# Patient Record
Sex: MALE | Race: BLACK OR AFRICAN AMERICAN | URBAN - METROPOLITAN AREA
[De-identification: names, ages, dates, MRNs, and addresses within clinical notes are randomized per-mention and may not be internally consistent; named-entity substitution may affect disease eponyms.]

---

## 2019-08-02 ENCOUNTER — OFFICE VISIT (OUTPATIENT)
Dept: URGENT CARE | Facility: URGENT CARE | Age: 19
End: 2019-08-02
Payer: COMMERCIAL

## 2019-08-02 ENCOUNTER — HOSPITAL ENCOUNTER (EMERGENCY)
Facility: CLINIC | Age: 19
Discharge: HOME | End: 2019-08-02
Attending: EMERGENCY MEDICINE | Admitting: EMERGENCY MEDICINE
Payer: COMMERCIAL

## 2019-08-02 VITALS
SYSTOLIC BLOOD PRESSURE: 111 MMHG | WEIGHT: 191 LBS | BODY MASS INDEX: 26.74 KG/M2 | OXYGEN SATURATION: 97 % | TEMPERATURE: 103.7 F | HEART RATE: 104 BPM | HEIGHT: 71 IN | DIASTOLIC BLOOD PRESSURE: 55 MMHG

## 2019-08-02 VITALS
TEMPERATURE: 101.5 F | WEIGHT: 192 LBS | OXYGEN SATURATION: 99 % | HEIGHT: 71 IN | SYSTOLIC BLOOD PRESSURE: 136 MMHG | RESPIRATION RATE: 18 BRPM | DIASTOLIC BLOOD PRESSURE: 53 MMHG | BODY MASS INDEX: 26.88 KG/M2

## 2019-08-02 DIAGNOSIS — J02.0 STREPTOCOCCAL PHARYNGITIS: ICD-10-CM

## 2019-08-02 DIAGNOSIS — R50.9 FEVER, UNSPECIFIED FEVER CAUSE: ICD-10-CM

## 2019-08-02 DIAGNOSIS — I95.9 HYPOTENSION, UNSPECIFIED HYPOTENSION TYPE: Primary | ICD-10-CM

## 2019-08-02 DIAGNOSIS — R00.0 TACHYCARDIA: ICD-10-CM

## 2019-08-02 LAB
BASOPHILS # BLD AUTO: 0 10E9/L (ref 0–0.2)
BASOPHILS NFR BLD AUTO: 0.1 %
DEPRECATED S PYO AG THROAT QL EIA: ABNORMAL
DIFFERENTIAL METHOD BLD: ABNORMAL
EOSINOPHIL # BLD AUTO: 0 10E9/L (ref 0–0.7)
EOSINOPHIL NFR BLD AUTO: 0 %
LYMPHOCYTES # BLD AUTO: 0.9 10E9/L (ref 0.8–5.3)
LYMPHOCYTES NFR BLD AUTO: 5.4 %
MONOCYTES # BLD AUTO: 0.9 10E9/L (ref 0–1.3)
MONOCYTES NFR BLD AUTO: 5.6 %
NEUTROPHILS # BLD AUTO: 14.9 10E9/L (ref 1.6–8.3)
NEUTROPHILS NFR BLD AUTO: 88.9 %
SPECIMEN SOURCE: ABNORMAL
WBC # BLD AUTO: 16.8 10E9/L (ref 4–11)

## 2019-08-02 PROCEDURE — 85048 AUTOMATED LEUKOCYTE COUNT: CPT | Performed by: PHYSICIAN ASSISTANT

## 2019-08-02 PROCEDURE — 99283 EMERGENCY DEPT VISIT LOW MDM: CPT

## 2019-08-02 PROCEDURE — 36415 COLL VENOUS BLD VENIPUNCTURE: CPT | Performed by: PHYSICIAN ASSISTANT

## 2019-08-02 PROCEDURE — 85004 AUTOMATED DIFF WBC COUNT: CPT | Performed by: PHYSICIAN ASSISTANT

## 2019-08-02 PROCEDURE — 99205 OFFICE O/P NEW HI 60 MIN: CPT | Performed by: PHYSICIAN ASSISTANT

## 2019-08-02 PROCEDURE — 87880 STREP A ASSAY W/OPTIC: CPT | Performed by: PHYSICIAN ASSISTANT

## 2019-08-02 PROCEDURE — 25000132 ZZH RX MED GY IP 250 OP 250 PS 637: Performed by: EMERGENCY MEDICINE

## 2019-08-02 RX ORDER — IBUPROFEN 200 MG
600 TABLET ORAL ONCE
Status: COMPLETED | OUTPATIENT
Start: 2019-08-02 | End: 2019-08-02

## 2019-08-02 RX ORDER — AMOXICILLIN 500 MG/1
1000 CAPSULE ORAL DAILY
Qty: 20 CAPSULE | Refills: 0 | Status: SHIPPED | OUTPATIENT
Start: 2019-08-02 | End: 2019-08-12

## 2019-08-02 RX ORDER — AMOXICILLIN 500 MG/1
1000 CAPSULE ORAL ONCE
Status: COMPLETED | OUTPATIENT
Start: 2019-08-02 | End: 2019-08-02

## 2019-08-02 RX ORDER — AMOXICILLIN 500 MG/1
500 CAPSULE ORAL 3 TIMES DAILY
Qty: 30 CAPSULE | Refills: 0 | Status: SHIPPED | OUTPATIENT
Start: 2019-08-02 | End: 2019-08-02

## 2019-08-02 RX ORDER — ACETAMINOPHEN 500 MG
1000 TABLET ORAL ONCE
Status: COMPLETED | OUTPATIENT
Start: 2019-08-02 | End: 2019-08-02

## 2019-08-02 RX ADMIN — Medication 1000 MG: at 21:00

## 2019-08-02 RX ADMIN — AMOXICILLIN 1000 MG: 500 CAPSULE ORAL at 22:49

## 2019-08-02 RX ADMIN — Medication 600 MG: at 20:50

## 2019-08-02 SDOH — HEALTH STABILITY: MENTAL HEALTH: HOW OFTEN DO YOU HAVE A DRINK CONTAINING ALCOHOL?: NEVER

## 2019-08-02 ASSESSMENT — ENCOUNTER SYMPTOMS
DIAPHORESIS: 1
TROUBLE SWALLOWING: 0
FEVER: 1
SORE THROAT: 1
HEADACHES: 1
CHILLS: 1
COUGH: 0
FATIGUE: 1

## 2019-08-02 ASSESSMENT — MIFFLIN-ST. JEOR
SCORE: 1908.04
SCORE: 1903.5

## 2019-08-02 NOTE — ED AVS SNAPSHOT
Emergency Department  [address] [place] [id number]  Phone:  [phone]  Fax:  [phone]                                    aLwrence Mcelroy   MRN: [id number]    Department:   Emergency Department   Date of Visit:  8/2/2019           After Visit Summary Signature Page    I have received my discharge instructions, and my questions have been answered. I have discussed any challenges I see with this plan with the nurse or doctor.    ..........................................................................................................................................  Patient/Patient Representative Signature      ..........................................................................................................................................  Patient Representative Print Name and Relationship to Patient    ..................................................               ................................................  Date                                   Time    ..........................................................................................................................................  Reviewed by Signature/Title    ...................................................              ..............................................  Date                                               Time          22EPIC Rev 08/18

## 2019-08-03 NOTE — PROGRESS NOTES
"SUBJECTIVE:   Lawrence Mcelroy is a 18 year old male presenting with a chief complaint of fever, chills, sweats, sore throat, headache and fatigue.  Onset of symptoms was 1 day(s) ago.  Course of illness is stable.    Severity moderate  Current and Associated symptoms: as above  Treatment measures tried include Tylenol/Ibuprofen.  Predisposing factors include None.    Notes burn on right wrist from one week ago that seems to be taking a long time to heal    Notes no asthma, no diabetes, no recent medications, no recent illness, treatment    Family history is noncontributory    Social History     Tobacco Use     Smoking status: Never Smoker     Smokeless tobacco: Never Used   Substance Use Topics     Alcohol use: Not on file       ROS:  Review of systems negative except as stated above.    OBJECTIVE:  /55 (Cuff Size: Adult Regular)   Pulse 104   Temp 103.7  F (39.8  C) (Oral)   Ht 1.803 m (5' 11\")   Wt 86.6 kg (191 lb)   SpO2 97%   BMI 26.64 kg/m    GENERAL APPEARANCE: mildly ill, alert and no distress  EYES: conjunctiva clear  HENT: ear canals and TM's normal.  Nose and mouth without ulcers, erythema or lesions  NECK: supple, nontender, no lymphadenopathy  RESP: lungs clear to auscultation - no rales, rhonchi or wheezes  CV: regular rates and rhythm, normal S1 S2, no murmur noted  ABDOMEN:  soft, nontender  NEURO: Normal strength and tone, sensory exam grossly normal,  normal speech and mentation  SKIN: no suspicious lesions or rashes      Results for orders placed or performed in visit on 08/02/19   WBC with Diff   Result Value Ref Range    WBC 16.8 (H) 4.0 - 11.0 10e9/L    % Neutrophils 88.9 %    % Lymphocytes 5.4 %    % Monocytes 5.6 %    % Eosinophils 0.0 %    % Basophils 0.1 %    Absolute Neutrophil 14.9 (H) 1.6 - 8.3 10e9/L    Absolute Lymphocytes 0.9 0.8 - 5.3 10e9/L    Absolute Monocytes 0.9 0.0 - 1.3 10e9/L    Absolute Eosinophils 0.0 0.0 - 0.7 10e9/L    Absolute Basophils 0.0 0.0 - 0.2 10e9/L    " Diff Method Automated Method    Strep, Rapid Screen   Result Value Ref Range    Specimen Description Throat     Rapid Strep A Screen (A)      POSITIVE: Group A Streptococcal antigen detected by immunoassay.         ASSESSMENT:  (I95.9) Hypotension, unspecified hypotension type  (primary encounter diagnosis)  (J02.0) Streptococcal pharyngitis  (R50.9) Fever, unspecified fever cause  (R00.0) Tachycardia  Comment: Concern  for sepsis  Plan: Strep, Rapid Screen, ibuprofen (ADVIL/MOTRIN)         tablet 600 mg, WBC with Diff, acetaminophen         (TYLENOL) tablet 1,000 mg    SENT TO [facility] BY PRIVATE VEHICLE

## 2019-08-03 NOTE — ED PROVIDER NOTES
"  History     Chief Complaint:  Fever    HPI   Lawrence Mcelroy is a 18 year old otherwise healthy male who presents with his significant other for evaluation of a fever after being referred to the ED from Urgent Care in the setting of a positive strep test. Patient reports that he was experiencing a fever, chills, sore throat, headache, fatigue and feeling diaphoretic for the last day, thus presented to Urgent Care. Patient was noted to have a positive strep test and had some blood work up as \"they were concerned about an infected burn\", per significant other. Patient was ultimately referred to the ED as Urgent Care staff were concerned as temperature was note decreasing and heart rate was elevated. Due to elevated white blood count in his blood work, high temperature and elevated heart rate, they were concerned for sepsis, thus referred here.    Here in the ED, patient reports that he asked for a dose of antibiotics from Urgent Care, but was referred here instead. He states he feels improved since he left Urgent Care and was given a dose of ibuprofen and Tylenol. Patient denies any cough or difficulty swallowing.    Allergies:  No Known Drug Allergies     Medications:    Amoxicillin    Past Medical History:    History reviewed. No pertinent past medical history.     Past Surgical History:    History reviewed. No pertinent past surgical history.     Family History:    History reviewed. No pertinent family history.      Social History:  The patient was accompanied to the ED by significant other.  Smoking Status: No  Smokeless Tobacco: No  Alcohol Use: No  Drug Use: No   Marital Status:  Single [1]     Review of Systems   Constitutional: Positive for chills, diaphoresis, fatigue and fever.   HENT: Positive for sore throat. Negative for trouble swallowing.    Respiratory: Negative for cough.    Neurological: Positive for headaches.   All other systems reviewed and are negative.      Physical Exam   Vitals:  Patient " "Vitals for the past 24 hrs:   BP Temp Temp src Heart Rate Resp SpO2 Height Weight   08/02/19 2219 136/53 101.5  F (38.6  C) Oral 92 18 99 % 1.803 m (5' 11\") 87.1 kg (192 lb)      Physical Exam  Eye:  Pupils are equal, round, and reactive.  Extraocular movements intact.    ENT:  No rhinorrhea.  Moist mucus membranes.  Normal tongue. Tonsils are 1+ with exudates bilaterally.    Cardiac:  Regular rate and rhythm.  No murmurs, gallops, or rubs.    Pulmonary:  Clear to auscultation bilaterally.  No wheezes, rales, or rhonchi.    Abdomen:  Positive bowel sounds.  Abdomen is soft and non-distended, without focal tenderness.    Musculoskeletal:  Normal movement of all extremities without evidence for deficit.    Skin:  Warm and dry without rashes.    Neurologic:  Non-focal exam without asymmetric weakness or numbness.     Psychiatric:  Normal affect with appropriate interaction with examiner.   Emergency Department Course   Interventions:  2249 Amoxicillin 1000 mg PO     Emergency Department Course:  Nursing notes and vitals reviewed.    (2229)   I performed an exam of the patient as documented above. History obtained from patient and girlfriend.    (2235)   Discussed plan of care. First dose of antibiotics will be provided in the ED. Patient comfortable with discharge.     I discussed the treatment plan with the patient. They expressed understanding of this plan and consented to discharge. They will be discharged home with instructions for care and follow up. In addition, the patient will return to the emergency department if their symptoms persist, worsen, if new symptoms arise or if there is any concern.  All questions were answered prior to discharge.    Impression & Plan      Medical Decision Making:  This healthy 18-year-old presents to us from urgent care with classic symptoms of streptococcal pharyngitis.  Because of his white blood cell count and fever, there was concern that he was septic.  However, he presents " here with normal vital signs and appears very well.  His physical exam is consistent with strep.  His strep test is reviewed and is positive.  There is no indication for IV fluids or antibiotics in this patient.  He is able to tolerate liquids without difficulty.  He was given his first dose of amoxicillin here.  He will be discharged with the same.  He was advised to return to us for inability to swallow, worsening of his symptoms, or other emergent concerns.    Diagnosis:    ICD-10-CM    1. Streptococcal pharyngitis J02.0         Disposition:   Discharged.    Scribe Disclosure:  Cornelia MELO, am serving as a scribe at 10:29 PM on 8/2/2019 to document services personally performed by Trierweiler, Chad A, MD, based on my observations and the provider's statements to me.  8/2/2019    EMERGENCY DEPARTMENT       Trierweiler, Chad A, MD  08/03/19 0309

## 2019-08-03 NOTE — PATIENT INSTRUCTIONS
Patient Education     Pharyngitis: Strep (Confirmed)    You have had a positive test for strep throat. Strep throat is a contagious illness. It is spread by coughing, kissing or by touching others after touching your mouth or nose. Symptoms include throat pain that is worse with swallowing, aching all over, headache, and fever. It is treated with antibiotic medicine. This should help you start to feel better in 1 to 2 days.  Home care    Rest at home. Drink plenty of fluids to you won't get dehydrated.    No work or school for the first 2 days of taking the antibiotics. After this time, you will not be contagious. You can then return to school or work if you are feeling better.     Take antibiotic medicine for the full 10 days, even if you feel better. This is very important to ensure the infection is treated. It is also important to prevent medicine-resistant germs from developing. If you were given an antibiotic shot, you don't need any more antibiotics.    You may use acetaminophen or ibuprofen to control pain or fever, unless another medicine was prescribed for this. Talk with your healthcare provider before taking these medicines if you have chronic liver or kidney disease. Also talk with your healthcare provider if you have had a stomach ulcer or GI bleeding.    Throat lozenges or sprays help reduce pain. Gargling with warm saltwater will also reduce throat pain. Dissolve 1/2 teaspoon of salt in 1 glass of warm water. This may be useful just before meals.     Soft foods are OK. Don't eat salty or spicy foods.  Follow-up care  Follow up with your healthcare provider or our staff if you don't get better over the next week.  When to seek medical advice  Call your healthcare provider right away if any of these occur:    Fever of 100.4 F (38 C) or higher, or as directed by your healthcare provider    New or worsening ear pain, sinus pain, or headache    Painful lumps in the back of neck    Stiff neck    Lymph  nodes getting larger or becoming soft in the middle    You can't swallow liquids or you can't open your mouth wide because of throat pain    Signs of dehydration. These include very dark urine or no urine, sunken eyes, and dizziness.    Trouble breathing or noisy breathing    Muffled voice    Rash  Prevention  Here are steps you can take to help prevent an infection:    Keep good hand washing habits.    Don t have close contact with people who have sore throats, colds, or other upper respiratory infections.    Don t smoke, and stay away from secondhand smoke.  Date Last Reviewed: 11/1/2017 2000-2018 [employer]. [address] [place] [address], [place] [address]. All rights reserved. This information is not intended as a substitute for professional medical care. Always follow your healthcare professional's instructions.

## 2019-08-03 NOTE — ED TRIAGE NOTES
Patient has had fever and sore throat for the last 24 hour, went to urgent care and was told he has strep throat. The patient asked for ABX but urgent care told him to come to the ED instead.